# Patient Record
Sex: FEMALE | Race: WHITE | NOT HISPANIC OR LATINO | URBAN - METROPOLITAN AREA
[De-identification: names, ages, dates, MRNs, and addresses within clinical notes are randomized per-mention and may not be internally consistent; named-entity substitution may affect disease eponyms.]

---

## 2017-04-27 ENCOUNTER — APPOINTMENT (RX ONLY)
Dept: URBAN - METROPOLITAN AREA OTHER 9 | Facility: OTHER | Age: 32
Setting detail: DERMATOLOGY
End: 2017-04-27

## 2017-04-27 DIAGNOSIS — L738 OTHER SPECIFIED DISEASES OF HAIR AND HAIR FOLLICLES: ICD-10-CM

## 2017-04-27 DIAGNOSIS — B36.0 PITYRIASIS VERSICOLOR: ICD-10-CM

## 2017-04-27 DIAGNOSIS — L663 OTHER SPECIFIED DISEASES OF HAIR AND HAIR FOLLICLES: ICD-10-CM

## 2017-04-27 DIAGNOSIS — L73.9 FOLLICULAR DISORDER, UNSPECIFIED: ICD-10-CM

## 2017-04-27 DIAGNOSIS — L70.0 ACNE VULGARIS: ICD-10-CM

## 2017-04-27 PROBLEM — L30.9 DERMATITIS, UNSPECIFIED: Status: ACTIVE | Noted: 2017-04-27

## 2017-04-27 PROCEDURE — 99203 OFFICE O/P NEW LOW 30 MIN: CPT

## 2017-04-27 PROCEDURE — ? TREATMENT REGIMEN

## 2017-04-27 PROCEDURE — ? PRESCRIPTION

## 2017-04-27 PROCEDURE — ? COUNSELING

## 2017-04-27 RX ORDER — ADAPALENE 1 MG/G
GEL TOPICAL
Qty: 1 | Refills: 0 | Status: ERX | COMMUNITY
Start: 2017-04-27

## 2017-04-27 RX ORDER — CLINDAMYCIN PHOSPHATE 10 MG/ML
SOLUTION TOPICAL
Qty: 1 | Refills: 1 | Status: ERX | COMMUNITY
Start: 2017-04-27

## 2017-04-27 RX ORDER — TRIAMCINOLONE ACETONIDE 1 MG/G
CREAM TOPICAL
Qty: 1 | Refills: 0 | Status: ERX | COMMUNITY
Start: 2017-04-27

## 2017-04-27 RX ADMIN — CLINDAMYCIN PHOSPHATE: 10 SOLUTION TOPICAL at 18:57

## 2017-04-27 RX ADMIN — TRIAMCINOLONE ACETONIDE: 1 CREAM TOPICAL at 18:57

## 2017-04-27 RX ADMIN — ADAPALENE: 1 GEL TOPICAL at 19:00

## 2017-04-27 ASSESSMENT — LOCATION ZONE DERM
LOCATION ZONE: TRUNK
LOCATION ZONE: FACE
LOCATION ZONE: SCALP

## 2017-04-27 ASSESSMENT — LOCATION DETAILED DESCRIPTION DERM
LOCATION DETAILED: RIGHT CENTRAL MALAR CHEEK
LOCATION DETAILED: RIGHT MEDIAL FRONTAL SCALP
LOCATION DETAILED: LEFT MEDIAL BREAST 8-9:00 REGION
LOCATION DETAILED: LEFT MEDIAL BREAST 7-8:00 REGION

## 2017-04-27 ASSESSMENT — SEVERITY ASSESSMENT
SEVERITY: MILD TO MODERATE
SEVERITY: MODERATE

## 2017-04-27 ASSESSMENT — PAIN INTENSITY VAS: HOW INTENSE IS YOUR PAIN 0 BEING NO PAIN, 10 BEING THE MOST SEVERE PAIN POSSIBLE?: NO PAIN

## 2017-04-27 ASSESSMENT — LOCATION SIMPLE DESCRIPTION DERM
LOCATION SIMPLE: RIGHT CHEEK
LOCATION SIMPLE: LEFT BREAST
LOCATION SIMPLE: RIGHT SCALP

## 2017-04-27 NOTE — PROCEDURE: TREATMENT REGIMEN
Detail Level: Simple
Initiate Treatment: Clindamycin gel apply QD\\nTriamcinolone cream 0.1% apply QD
Initiate Treatment: Xolegel apply bid
Initiate Treatment: Adapalene gel 0.1% apply at night

## 2018-01-19 ENCOUNTER — ACNE/ROSACEA (OUTPATIENT)
Dept: URBAN - METROPOLITAN AREA CLINIC 32 | Facility: CLINIC | Age: 33
Setting detail: DERMATOLOGY
End: 2018-01-19

## 2018-01-19 PROBLEM — L71.8 OTHER ROSACEA: Status: RESOLVED | Noted: 2018-01-19

## 2018-01-19 PROCEDURE — 99202 OFFICE O/P NEW SF 15 MIN: CPT

## 2018-03-19 ENCOUNTER — RX ONLY (RX ONLY)
Age: 33
End: 2018-03-19

## 2018-03-19 RX ORDER — SODIUM SULFACETAMIDE AND SULFUR 80; 40 MG/ML; MG/ML
1 ML LOTION TOPICAL BID
Qty: 473 | Refills: 1 | Status: DISCONTINUED
Start: 2018-03-19 | End: 2018-05-15

## 2018-05-15 ENCOUNTER — RX ONLY (RX ONLY)
Age: 33
End: 2018-05-15

## 2018-05-15 RX ORDER — SODIUM SULFACETAMIDE AND SULFUR 80; 40 MG/ML; MG/ML
1 ML LOTION TOPICAL BID
Qty: 473 | Refills: 1 | Status: DISCONTINUED
Start: 2018-05-15 | End: 2018-07-23

## 2018-07-23 ENCOUNTER — RX ONLY (RX ONLY)
Age: 33
End: 2018-07-23

## 2018-07-23 RX ORDER — SODIUM SULFACETAMIDE AND SULFUR 80; 40 MG/ML; MG/ML
1 ML LOTION TOPICAL BID
Qty: 473 | Refills: 1 | Status: DISCONTINUED
Start: 2018-07-23 | End: 2019-01-29

## 2019-01-29 ENCOUNTER — ACNE/ROSACEA (OUTPATIENT)
Dept: URBAN - METROPOLITAN AREA CLINIC 34 | Facility: CLINIC | Age: 34
Setting detail: DERMATOLOGY
End: 2019-01-29

## 2019-01-29 DIAGNOSIS — L81.6 OTHER DISORDERS OF DIMINISHED MELANIN FORMATION: ICD-10-CM

## 2019-01-29 DIAGNOSIS — L80 VITILIGO: ICD-10-CM

## 2019-01-29 PROBLEM — L71.8 OTHER ROSACEA: Status: RESOLVED | Noted: 2019-01-29

## 2019-01-29 PROCEDURE — 99213 OFFICE O/P EST LOW 20 MIN: CPT

## 2019-12-12 ENCOUNTER — ACNE/ROSACEA (OUTPATIENT)
Dept: URBAN - METROPOLITAN AREA CLINIC 34 | Facility: CLINIC | Age: 34
Setting detail: DERMATOLOGY
End: 2019-12-12

## 2019-12-12 DIAGNOSIS — R20.2 PARESTHESIA OF SKIN: ICD-10-CM

## 2019-12-12 DIAGNOSIS — L56.8 OTHER SPECIFIED ACUTE SKIN CHANGES DUE TO ULTRAVIOLET RADIATION: ICD-10-CM

## 2019-12-12 DIAGNOSIS — Z85.828 PERSONAL HISTORY OF OTHER MALIGNANT NEOPLASM OF SKIN: ICD-10-CM

## 2019-12-12 DIAGNOSIS — D22.5 MELANOCYTIC NEVI OF TRUNK: ICD-10-CM

## 2019-12-12 PROBLEM — L73.8 OTHER SPECIFIED FOLLICULAR DISORDERS: Status: RESOLVED | Noted: 2019-12-12

## 2019-12-12 PROBLEM — L71.8 OTHER ROSACEA: Status: RESOLVED | Noted: 2019-12-12

## 2019-12-12 PROCEDURE — 99212 OFFICE O/P EST SF 10 MIN: CPT

## 2019-12-12 RX ORDER — KETOCONAZOLE 20 MG/ML
1 APPLICATION SHAMPOO, SUSPENSION TOPICAL DAILY
Qty: 120 | Refills: 11
Start: 2019-12-12